# Patient Record
Sex: FEMALE | Race: ASIAN | NOT HISPANIC OR LATINO
[De-identification: names, ages, dates, MRNs, and addresses within clinical notes are randomized per-mention and may not be internally consistent; named-entity substitution may affect disease eponyms.]

---

## 2017-01-13 ENCOUNTER — APPOINTMENT (OUTPATIENT)
Dept: PULMONOLOGY | Facility: CLINIC | Age: 57
End: 2017-01-13

## 2017-01-13 VITALS
DIASTOLIC BLOOD PRESSURE: 76 MMHG | SYSTOLIC BLOOD PRESSURE: 114 MMHG | HEART RATE: 72 BPM | HEIGHT: 67 IN | WEIGHT: 136 LBS | OXYGEN SATURATION: 100 % | BODY MASS INDEX: 21.35 KG/M2

## 2017-02-25 ENCOUNTER — RX RENEWAL (OUTPATIENT)
Age: 57
End: 2017-02-25

## 2017-03-03 ENCOUNTER — APPOINTMENT (OUTPATIENT)
Dept: MAMMOGRAPHY | Facility: IMAGING CENTER | Age: 57
End: 2017-03-03

## 2017-03-03 ENCOUNTER — OUTPATIENT (OUTPATIENT)
Dept: OUTPATIENT SERVICES | Facility: HOSPITAL | Age: 57
LOS: 1 days | End: 2017-03-03
Payer: COMMERCIAL

## 2017-03-03 DIAGNOSIS — Z00.8 ENCOUNTER FOR OTHER GENERAL EXAMINATION: ICD-10-CM

## 2017-03-03 PROCEDURE — 77067 SCR MAMMO BI INCL CAD: CPT

## 2017-03-03 PROCEDURE — 77063 BREAST TOMOSYNTHESIS BI: CPT

## 2017-06-09 ENCOUNTER — APPOINTMENT (OUTPATIENT)
Dept: PULMONOLOGY | Facility: CLINIC | Age: 57
End: 2017-06-09

## 2017-06-09 VITALS
SYSTOLIC BLOOD PRESSURE: 110 MMHG | HEIGHT: 67 IN | DIASTOLIC BLOOD PRESSURE: 66 MMHG | HEART RATE: 86 BPM | OXYGEN SATURATION: 99 %

## 2017-07-19 ENCOUNTER — RX RENEWAL (OUTPATIENT)
Age: 57
End: 2017-07-19

## 2017-07-21 ENCOUNTER — APPOINTMENT (OUTPATIENT)
Dept: PULMONOLOGY | Facility: CLINIC | Age: 57
End: 2017-07-21

## 2017-08-21 ENCOUNTER — RX RENEWAL (OUTPATIENT)
Age: 57
End: 2017-08-21

## 2017-12-20 ENCOUNTER — APPOINTMENT (OUTPATIENT)
Dept: PULMONOLOGY | Facility: CLINIC | Age: 57
End: 2017-12-20
Payer: COMMERCIAL

## 2017-12-20 VITALS
HEART RATE: 77 BPM | SYSTOLIC BLOOD PRESSURE: 110 MMHG | BODY MASS INDEX: 21.19 KG/M2 | DIASTOLIC BLOOD PRESSURE: 70 MMHG | OXYGEN SATURATION: 96 % | WEIGHT: 135 LBS | HEIGHT: 67 IN

## 2017-12-20 PROCEDURE — 99214 OFFICE O/P EST MOD 30 MIN: CPT | Mod: 25

## 2017-12-20 PROCEDURE — 94729 DIFFUSING CAPACITY: CPT

## 2017-12-20 PROCEDURE — 94060 EVALUATION OF WHEEZING: CPT

## 2017-12-20 PROCEDURE — 94727 GAS DIL/WSHOT DETER LNG VOL: CPT

## 2017-12-20 RX ORDER — ALBUTEROL SULFATE 90 UG/1
108 (90 BASE) AEROSOL, METERED RESPIRATORY (INHALATION) EVERY 6 HOURS
Qty: 1 | Refills: 3 | Status: ACTIVE | COMMUNITY
Start: 2017-12-20 | End: 1900-01-01

## 2018-03-20 ENCOUNTER — RX RENEWAL (OUTPATIENT)
Age: 58
End: 2018-03-20

## 2018-03-30 ENCOUNTER — OUTPATIENT (OUTPATIENT)
Dept: OUTPATIENT SERVICES | Facility: HOSPITAL | Age: 58
LOS: 1 days | End: 2018-03-30
Payer: COMMERCIAL

## 2018-03-30 ENCOUNTER — APPOINTMENT (OUTPATIENT)
Dept: MAMMOGRAPHY | Facility: IMAGING CENTER | Age: 58
End: 2018-03-30
Payer: COMMERCIAL

## 2018-03-30 DIAGNOSIS — Z00.8 ENCOUNTER FOR OTHER GENERAL EXAMINATION: ICD-10-CM

## 2018-03-30 PROCEDURE — 77063 BREAST TOMOSYNTHESIS BI: CPT | Mod: 26

## 2018-03-30 PROCEDURE — 77067 SCR MAMMO BI INCL CAD: CPT

## 2018-03-30 PROCEDURE — 77063 BREAST TOMOSYNTHESIS BI: CPT

## 2018-03-30 PROCEDURE — 77067 SCR MAMMO BI INCL CAD: CPT | Mod: 26

## 2018-04-25 ENCOUNTER — RX RENEWAL (OUTPATIENT)
Age: 58
End: 2018-04-25

## 2018-06-20 ENCOUNTER — APPOINTMENT (OUTPATIENT)
Dept: PULMONOLOGY | Facility: CLINIC | Age: 58
End: 2018-06-20
Payer: COMMERCIAL

## 2018-06-20 VITALS
WEIGHT: 135 LBS | DIASTOLIC BLOOD PRESSURE: 80 MMHG | OXYGEN SATURATION: 98 % | SYSTOLIC BLOOD PRESSURE: 118 MMHG | HEART RATE: 73 BPM | BODY MASS INDEX: 21.14 KG/M2

## 2018-06-20 PROCEDURE — 99214 OFFICE O/P EST MOD 30 MIN: CPT | Mod: 25

## 2018-06-20 PROCEDURE — 94727 GAS DIL/WSHOT DETER LNG VOL: CPT

## 2018-06-20 PROCEDURE — 94060 EVALUATION OF WHEEZING: CPT

## 2018-06-20 PROCEDURE — 94729 DIFFUSING CAPACITY: CPT

## 2018-08-30 ENCOUNTER — RX RENEWAL (OUTPATIENT)
Age: 58
End: 2018-08-30

## 2018-12-18 ENCOUNTER — RX RENEWAL (OUTPATIENT)
Age: 58
End: 2018-12-18

## 2019-01-22 ENCOUNTER — APPOINTMENT (OUTPATIENT)
Dept: PULMONOLOGY | Facility: CLINIC | Age: 59
End: 2019-01-22
Payer: COMMERCIAL

## 2019-01-22 VITALS
OXYGEN SATURATION: 95 % | BODY MASS INDEX: 20.36 KG/M2 | DIASTOLIC BLOOD PRESSURE: 70 MMHG | SYSTOLIC BLOOD PRESSURE: 110 MMHG | WEIGHT: 130 LBS | HEART RATE: 70 BPM

## 2019-01-22 PROCEDURE — 99214 OFFICE O/P EST MOD 30 MIN: CPT

## 2019-02-28 ENCOUNTER — RX RENEWAL (OUTPATIENT)
Age: 59
End: 2019-02-28

## 2019-05-21 ENCOUNTER — APPOINTMENT (OUTPATIENT)
Dept: PULMONOLOGY | Facility: CLINIC | Age: 59
End: 2019-05-21

## 2019-07-15 ENCOUNTER — TRANSCRIPTION ENCOUNTER (OUTPATIENT)
Age: 59
End: 2019-07-15

## 2019-11-25 ENCOUNTER — RX RENEWAL (OUTPATIENT)
Age: 59
End: 2019-11-25

## 2020-02-10 ENCOUNTER — APPOINTMENT (OUTPATIENT)
Dept: PULMONOLOGY | Facility: CLINIC | Age: 60
End: 2020-02-10
Payer: COMMERCIAL

## 2020-02-10 VITALS
SYSTOLIC BLOOD PRESSURE: 128 MMHG | RESPIRATION RATE: 16 BRPM | OXYGEN SATURATION: 95 % | TEMPERATURE: 98.7 F | HEIGHT: 67 IN | BODY MASS INDEX: 20.25 KG/M2 | WEIGHT: 129 LBS | HEART RATE: 73 BPM | DIASTOLIC BLOOD PRESSURE: 81 MMHG

## 2020-02-10 DIAGNOSIS — Z87.09 PERSONAL HISTORY OF OTHER DISEASES OF THE RESPIRATORY SYSTEM: ICD-10-CM

## 2020-02-10 PROCEDURE — 99214 OFFICE O/P EST MOD 30 MIN: CPT

## 2020-02-10 NOTE — PHYSICAL EXAM
[Normal Oropharynx] : normal oropharynx [No Acute Distress] : no acute distress [I] : Mallampati Class: I [Normal Appearance] : normal appearance [No Neck Mass] : no neck mass [Normal Rate/Rhythm] : normal rate/rhythm [Normal S1, S2] : normal s1, s2 [No Murmurs] : no murmurs [No Resp Distress] : no resp distress [Benign] : benign [No HSM] : no hsm [No Hernias] : no hernias [No Edema] : no edema [Oriented x3] : oriented x3 [Normal Affect] : normal affect

## 2020-09-25 ENCOUNTER — APPOINTMENT (OUTPATIENT)
Dept: PULMONOLOGY | Facility: CLINIC | Age: 60
End: 2020-09-25
Payer: COMMERCIAL

## 2020-09-25 VITALS
SYSTOLIC BLOOD PRESSURE: 123 MMHG | RESPIRATION RATE: 16 BRPM | WEIGHT: 123 LBS | BODY MASS INDEX: 19.3 KG/M2 | HEART RATE: 77 BPM | TEMPERATURE: 97.3 F | DIASTOLIC BLOOD PRESSURE: 80 MMHG | HEIGHT: 67 IN | OXYGEN SATURATION: 96 %

## 2020-09-25 DIAGNOSIS — Z00.00 ENCOUNTER FOR GENERAL ADULT MEDICAL EXAMINATION W/OUT ABNORMAL FINDINGS: ICD-10-CM

## 2020-09-25 PROCEDURE — 90662 IIV NO PRSV INCREASED AG IM: CPT

## 2020-09-25 PROCEDURE — G0008: CPT

## 2020-09-25 PROCEDURE — 99214 OFFICE O/P EST MOD 30 MIN: CPT | Mod: 25

## 2020-09-25 NOTE — PHYSICAL EXAM
[No Acute Distress] : no acute distress [Normal Oropharynx] : normal oropharynx [I] : Mallampati Class: I [Normal Appearance] : normal appearance [No Neck Mass] : no neck mass [Normal Rate/Rhythm] : normal rate/rhythm [Normal S1, S2] : normal s1, s2 [No Murmurs] : no murmurs [No Resp Distress] : no resp distress [Benign] : benign [No HSM] : no hsm [No Hernias] : no hernias [No Edema] : no edema [Oriented x3] : oriented x3 [Normal Affect] : normal affect

## 2020-10-02 ENCOUNTER — MED ADMIN CHARGE (OUTPATIENT)
Age: 60
End: 2020-10-02

## 2020-12-23 PROBLEM — Z87.09 HISTORY OF UPPER RESPIRATORY INFECTION: Status: RESOLVED | Noted: 2018-06-20 | Resolved: 2020-12-23

## 2021-01-25 ENCOUNTER — RX RENEWAL (OUTPATIENT)
Age: 61
End: 2021-01-25

## 2021-03-20 ENCOUNTER — NON-APPOINTMENT (OUTPATIENT)
Age: 61
End: 2021-03-20

## 2021-04-09 ENCOUNTER — APPOINTMENT (OUTPATIENT)
Dept: PULMONOLOGY | Facility: CLINIC | Age: 61
End: 2021-04-09
Payer: COMMERCIAL

## 2021-04-09 PROCEDURE — 99072 ADDL SUPL MATRL&STAF TM PHE: CPT

## 2021-04-09 PROCEDURE — 99213 OFFICE O/P EST LOW 20 MIN: CPT

## 2021-04-09 NOTE — DISCUSSION/SUMMARY
[FreeTextEntry1] : \par Asthma, allergic.  Continue Symbicort 160/4.5, montelukast, albuterol as needed\par \par influenza vaccine given today\par \par She believes she had pneumonia vaccine\par \par \par Ricky Burgos MD [facility]

## 2021-04-09 NOTE — HISTORY OF PRESENT ILLNESS
[TextBox_4] : 60 year old woman returns for follow up.\par \par She had been using Symbicort 160/4.5 1 puff twice per day, with montelukast and Proair. She has elevated IgE and allergy to several factors. she occasionally uses antihistamine. \par Allergies are minimal still, with minimal symptoms.  she has been working from home. \par \par She has had no exacerbation. However, she has had minor sore throat and chill. \par \par \par CT chest 2/2016 revealed stable scarring with mucoid impaction, mild bronchiolitis \par \par She had COVID  antibody testing which was negative\par \par \par SH\par \par never smoked [TextBox_19] : SLEEP\par No snoring, witnessed apnea, excessive daytime sleepiness, morning headache

## 2021-04-09 NOTE — REVIEW OF SYSTEMS
[Chills] : chills [Postnasal Drip] : postnasal drip [Sinus Problems] : sinus problems [Cough] : cough [Hay Fever] : hay fever [Fever] : no fever [Nasal Congestion] : no nasal congestion [Sputum] : not coughing up ~M sputum [Dyspnea] : no dyspnea [Orthopnea] : no orthopnea [Dysrhythmia] : no dysrhythmia [Edema] : ~T edema was not present [Nasal Discharge] : no nasal discharge [Nausea] : no nausea [Urgency] : no feelings of urinary urgency

## 2021-04-09 NOTE — REVIEW OF SYSTEMS
[Dry Eyes] : no dry eyes [Fever] : no fever [Nasal Congestion] : no nasal congestion [Cough] : no cough [Sputum] : no sputum [Dyspnea] : no dyspnea [GERD] : no gerd

## 2021-04-09 NOTE — DISCUSSION/SUMMARY
[FreeTextEntry1] : \par Asthma, allergic.  Continue ICS/LABA, montelukast, albuterol as needed\par \par She uses antihistamine as needed\par \par No sinus symptoms\par \par She had pneumonia vaccine and influenza vaccine\par \par Also received full COVID vaccine\par \par She will continue regimen for asthma\par \par She will undergo CXR given he has history of latent TB\par \par \par [facility]

## 2021-04-09 NOTE — REVIEW OF SYSTEMS
[Nasal Congestion] : nasal congestion [Cough] : cough [Fever] : no fever [Dry Eyes] : no dry eyes [Sputum] : no sputum [Dyspnea] : no dyspnea [GERD] : no gerd

## 2021-04-09 NOTE — REVIEW OF SYSTEMS
[Fever] : no fever [Dry Eyes] : no dry eyes [Nasal Congestion] : no nasal congestion [Cough] : no cough [Sputum] : no sputum [Dyspnea] : no dyspnea [GERD] : no gerd

## 2021-04-09 NOTE — DISCUSSION/SUMMARY
[FreeTextEntry1] : mild URI\par \par improved\par \par Asthma, allergic.  She will continue Symbicort 160/4.5, montelukast, albuterol asneeded\par \par \par [facility]

## 2021-04-09 NOTE — PHYSICAL EXAM
[General Appearance - Well Developed] : well developed [General Appearance - Well Nourished] : well nourished [No Deformities] : no deformities [Normal Conjunctiva] : the conjunctiva exhibited no abnormalities [Normal Oropharynx] : normal oropharynx [Neck Appearance] : the appearance of the neck was normal [Neck Cervical Mass (___cm)] : no neck mass was observed [Jugular Venous Distention Increased] : there was no jugular-venous distention [Heart Rate And Rhythm] : heart rate and rhythm were normal [Heart Sounds] : normal S1 and S2 [Murmurs] : no murmurs present [Arterial Pulses Normal] : the arterial pulses were normal [Respiration, Rhythm And Depth] : normal respiratory rhythm and effort [Exaggerated Use Of Accessory Muscles For Inspiration] : no accessory muscle use [Bowel Sounds] : normal bowel sounds [Abdomen Soft] : soft [Abdomen Tenderness] : non-tender [] : no hepato-splenomegaly [Abnormal Walk] : normal gait [Motor Exam] : the motor exam was normal [Affect] : the affect was normal [Mood] : the mood was normal [Low Lying Soft Palate] : no low lying soft palate [Erythema] : no erythema of the pharynx [FreeTextEntry1] : no edema

## 2021-04-09 NOTE — HISTORY OF PRESENT ILLNESS
[TextBox_4] : 61 year old woman returns for follow up for allergic asthma.  A prior CT of the sinuses has shown sinusitis. \par She has elevated IgE and allergy to several factors. she occasionally uses antihistamine. \par Allergies are minimal still, with minimal symptoms.  she has been working from home. \par \par She had been using Symbicort 160/4.5 1 puff twice per day, with montelukast and Proair. \par \par She will switch to generic Advair.\par \par She has had no exacerbation. However, she does note some allergy symptoms.  She has minor dyspnea on exertion. \par \par \par CT chest 2/2016 revealed stable scarring with mucoid impaction, mild bronchiolitis \par \par She has  had COVID  antibody testing which was negative\par \par She has had COVID vaccine.\par \par She has latent TB, diagnosed about 6 years ago.  Treatment with antibiotic  has been deferred and she has not had reactivation. \par \par \par SH\par \par never smoked [TextBox_19] : SLEEP\par No snoring, witnessed apnea, excessive daytime sleepiness, morning headache

## 2021-04-09 NOTE — PHYSICAL EXAM
[No Acute Distress] : no acute distress [Normal Oropharynx] : normal oropharynx [I] : Mallampati Class: I [Normal Appearance] : normal appearance [No Neck Mass] : no neck mass [Normal S1, S2] : normal s1, s2 [Normal Rate/Rhythm] : normal rate/rhythm [No Murmurs] : no murmurs [No Resp Distress] : no resp distress [Benign] : benign [No HSM] : no hsm [No Hernias] : no hernias [No Edema] : no edema [Oriented x3] : oriented x3 [Normal Affect] : normal affect

## 2021-04-09 NOTE — HISTORY OF PRESENT ILLNESS
[TextBox_4] : 58 year old woman returns for follow up.\par \par She had been using Symbicort 160/4.5 1 puff twice per day, with montelukast and Proair. She has elevated IgE and allergy to several factors. she occasionally uses antihistamine. She has latent TB.\par Allergies are minimal.\par \par She had URI recently that is improving,  still has some cough but no dyspnea.\par \par \par CT chest 2/2016 revealed stable scarring with mucoid impaction, mild bronchiolitis \par \par \par SH\par \par never smoked

## 2021-04-09 NOTE — DISCUSSION/SUMMARY
[FreeTextEntry1] : Asthma, allergic\par \par \par She will remain on Symbicort 160/4.5 2 puffs twice  per day, montelukast and albuterol rescue.\par she has had her influenza vaccine this season\par \par She may use antihistamine for allergy symptoms\par \par She has dust mite shields on bed\par \par [facility]

## 2021-04-09 NOTE — HISTORY OF PRESENT ILLNESS
[FreeTextEntry1] : 58 year old woman returns for follow up.\par \par She had  been using Symbicort 80/4.5 1 puff twice per day, with montelukast and Proair.  She noted increased dyspnea and asked to be restarted on Symbicort 160/4.5.  She states that she feels improved since switching.  Se has elevated Ige and allergy to several factors.  she occasionally uses antihistamine.  \par Allergies are minimal.\par \par CT chest 2/2016 revealed stable scarring with mucoid impaction, mild bronchiolitis  \par \par \par

## 2021-08-16 ENCOUNTER — LABORATORY RESULT (OUTPATIENT)
Age: 61
End: 2021-08-16

## 2021-08-16 ENCOUNTER — APPOINTMENT (OUTPATIENT)
Dept: PULMONOLOGY | Facility: CLINIC | Age: 61
End: 2021-08-16
Payer: COMMERCIAL

## 2021-08-16 VITALS
DIASTOLIC BLOOD PRESSURE: 70 MMHG | OXYGEN SATURATION: 98 % | WEIGHT: 127 LBS | SYSTOLIC BLOOD PRESSURE: 122 MMHG | BODY MASS INDEX: 19.89 KG/M2 | HEART RATE: 83 BPM | TEMPERATURE: 96.8 F

## 2021-08-16 PROCEDURE — 99213 OFFICE O/P EST LOW 20 MIN: CPT

## 2021-08-16 NOTE — DISCUSSION/SUMMARY
[FreeTextEntry1] : Increased sputum \par history allergy\par \par Plan\par \par sputum for AFB rule out IDRIS\par \par Recheck allergy parameters, hypersensitivity panel\par \par Ricky Burgos MD

## 2021-08-16 NOTE — HISTORY OF PRESENT ILLNESS
[TextBox_4] : 61 year old woman returns for follow up for allergic asthma.  A prior CT of the sinuses has shown sinusitis. \par She has elevated IgE and allergy to several factors. she occasionally uses antihistamine. \par Allergies are minimal still, with minimal symptoms.  she has been working from home. \par \par She had been using Symbicort 160/4.5 1 puff twice per day, with montelukast and Proair. \par \par She will switch to generic Advair.\par \par She has had no exacerbation. However, she does note some allergy symptoms.  She has minor dyspnea on exertion. \par \par \par CT chest 2/2016 revealed stable scarring with mucoid impaction, mild bronchiolitis \par \par She has  had COVID  antibody testing which was negative\par \par She has had COVID vaccine.\par \par She has latent TB, diagnosed about 6 years ago.  Treatment with antibiotic  has been deferred and she has not had reactivation. \par \par She felt worse when using Advair.  She has switched back to Symbicort, with mucinex.\par \par CT chest did demonstrate increased mucus.\par \par She feels better.\par \par She only has cough in the morning, with clear sputum.  However, during exacerbation sputum was yellow.\par \par \par \par \par \par SH\par \par never smoked [TextBox_19] : SLEEP\par No snoring, witnessed apnea, excessive daytime sleepiness, morning headache

## 2021-09-12 LAB
A ALTERNATA IGE QN: <0.1 KUA/L
A FUMIGATUS IGE QN: 0.41 KUA/L
BASOPHILS # BLD AUTO: 0.05 K/UL
BASOPHILS NFR BLD AUTO: 0.8 %
BERMUDA GRASS IGE QN: 1.36 KUA/L
BOXELDER IGE QN: 1.23 KUA/L
C HERBARUM IGE QN: <0.1 KUA/L
CALIF WALNUT IGE QN: 2.42 KUA/L
CAT DANDER IGE QN: 1.31 KUA/L
CMN PIGWEED IGE QN: 1.28 KUA/L
COMMON RAGWEED IGE QN: 1.28 KUA/L
COTTONWOOD IGE QN: 1.25 KUA/L
D FARINAE IGE QN: 1.01 KUA/L
D PTERONYSS IGE QN: 0.84 KUA/L
DEPRECATED A ALTERNATA IGE RAST QL: 0
DEPRECATED A FUMIGATUS IGE RAST QL: 1
DEPRECATED BERMUDA GRASS IGE RAST QL: 2
DEPRECATED BOXELDER IGE RAST QL: 2
DEPRECATED C HERBARUM IGE RAST QL: 0
DEPRECATED CAT DANDER IGE RAST QL: 2
DEPRECATED COMMON PIGWEED IGE RAST QL: 2
DEPRECATED COMMON RAGWEED IGE RAST QL: 2
DEPRECATED COTTONWOOD IGE RAST QL: 2
DEPRECATED D FARINAE IGE RAST QL: 2
DEPRECATED D PTERONYSS IGE RAST QL: 2
DEPRECATED DOG DANDER IGE RAST QL: NORMAL
DEPRECATED GOOSEFOOT IGE RAST QL: 2
DEPRECATED LONDON PLANE IGE RAST QL: 2
DEPRECATED MOUSE URINE PROT IGE RAST QL: 0
DEPRECATED MUGWORT IGE RAST QL: 1
DEPRECATED P NOTATUM IGE RAST QL: NORMAL
DEPRECATED RED CEDAR IGE RAST QL: 1
DEPRECATED ROACH IGE RAST QL: NORMAL
DEPRECATED SHEEP SORREL IGE RAST QL: 2
DEPRECATED SILVER BIRCH IGE RAST QL: 4
DEPRECATED TIMOTHY IGE RAST QL: 2
DEPRECATED WHITE ASH IGE RAST QL: 2
DEPRECATED WHITE OAK IGE RAST QL: 4
DOG DANDER IGE QN: 0.21 KUA/L
EOSINOPHIL # BLD AUTO: 0.44 K/UL
EOSINOPHIL NFR BLD AUTO: 6.8 %
GOOSEFOOT IGE QN: 1.15 KUA/L
HCT VFR BLD CALC: 40.3 %
HGB BLD-MCNC: 13.1 G/DL
IGE SER-MCNC: 178 KU/L
IMM GRANULOCYTES NFR BLD AUTO: 0.2 %
LONDON PLANE IGE QN: 3.36 KUA/L
LYMPHOCYTES # BLD AUTO: 1.68 K/UL
LYMPHOCYTES NFR BLD AUTO: 26.1 %
MAN DIFF?: NORMAL
MCHC RBC-ENTMCNC: 32.5 GM/DL
MCHC RBC-ENTMCNC: 32.7 PG
MCV RBC AUTO: 100.5 FL
MONOCYTES # BLD AUTO: 0.51 K/UL
MONOCYTES NFR BLD AUTO: 7.9 %
MOUSE URINE PROT IGE QN: <0.1 KUA/L
MUGWORT IGE QN: 0.66 KUA/L
MULBERRY (T70) CLASS: NORMAL
MULBERRY (T70) CONC: 0.11 KUA/L
NEUTROPHILS # BLD AUTO: 3.75 K/UL
NEUTROPHILS NFR BLD AUTO: 58.2 %
P NOTATUM IGE QN: 0.24 KUA/L
PLATELET # BLD AUTO: 239 K/UL
RBC # BLD: 4.01 M/UL
RBC # FLD: 12.3 %
RED CEDAR IGE QN: 0.46 KUA/L
ROACH IGE QN: 0.17 KUA/L
SHEEP SORREL IGE QN: 1.04 KUA/L
SILVER BIRCH IGE QN: 28.1 KUA/L
TIMOTHY IGE QN: 1.47 KUA/L
TREE ALLERG MIX1 IGE QL: 2
WBC # FLD AUTO: 6.44 K/UL
WHITE ASH IGE QN: 2.75 KUA/L
WHITE ELM IGE QN: 1.73 KUA/L
WHITE ELM IGE QN: 2
WHITE OAK IGE QN: 20.4 KUA/L

## 2021-10-08 ENCOUNTER — APPOINTMENT (OUTPATIENT)
Dept: PULMONOLOGY | Facility: CLINIC | Age: 61
End: 2021-10-08

## 2021-11-07 ENCOUNTER — TRANSCRIPTION ENCOUNTER (OUTPATIENT)
Age: 61
End: 2021-11-07

## 2021-12-13 ENCOUNTER — APPOINTMENT (OUTPATIENT)
Dept: PULMONOLOGY | Facility: CLINIC | Age: 61
End: 2021-12-13

## 2022-01-23 ENCOUNTER — RX RENEWAL (OUTPATIENT)
Age: 62
End: 2022-01-23

## 2022-04-20 ENCOUNTER — APPOINTMENT (OUTPATIENT)
Dept: PULMONOLOGY | Facility: CLINIC | Age: 62
End: 2022-04-20
Payer: COMMERCIAL

## 2022-04-20 VITALS
DIASTOLIC BLOOD PRESSURE: 74 MMHG | WEIGHT: 127 LBS | HEART RATE: 79 BPM | BODY MASS INDEX: 19.89 KG/M2 | SYSTOLIC BLOOD PRESSURE: 128 MMHG | OXYGEN SATURATION: 95 % | TEMPERATURE: 98.2 F

## 2022-04-20 PROCEDURE — 99214 OFFICE O/P EST MOD 30 MIN: CPT

## 2022-04-20 RX ORDER — FLUTICASONE PROPIONATE AND SALMETEROL 250; 50 UG/1; UG/1
250-50 POWDER RESPIRATORY (INHALATION)
Qty: 3 | Refills: 0 | Status: DISCONTINUED | COMMUNITY
Start: 2021-03-20 | End: 2022-04-20

## 2022-04-20 NOTE — PHYSICAL EXAM
[No Acute Distress] : no acute distress [Normal Oropharynx] : normal oropharynx [I] : Mallampati Class: I [Normal Appearance] : normal appearance [No Neck Mass] : no neck mass [Normal Rate/Rhythm] : normal rate/rhythm [Normal S1, S2] : normal s1, s2 [No Murmurs] : no murmurs [No Resp Distress] : no resp distress [Benign] : benign [No HSM] : no hsm [No Hernias] : no hernias [No Edema] : no edema [No Focal Deficits] : no focal deficits [Oriented x3] : oriented x3 [Normal Affect] : normal affect

## 2022-04-21 RX ORDER — OLOPATADINE HCL 1 MG/ML
0.1 SOLUTION/ DROPS OPHTHALMIC TWICE DAILY
Qty: 1 | Refills: 3 | Status: ACTIVE | COMMUNITY
Start: 2021-04-09 | End: 1900-01-01

## 2022-04-21 NOTE — HISTORY OF PRESENT ILLNESS
[TextBox_4] : 62 year old woman returns for follow up for allergic asthma.  A prior CT of the sinuses has shown sinusitis. \par She has elevated IgE and allergy to several factors. she occasionally uses antihistamine. \par Allergies are minimal still, with minimal symptoms.  she has been working from home. \par \par CT chest 2/2016 revealed stable scarring with mucoid impaction, mild bronchiolitis \par \par She has  had COVID  antibody testing which was negative\par \par She has had COVID vaccine.\par \par She has latent TB, diagnosed about 6 years ago.  Treatment with antibiotic  has been deferred and she has not had reactivation. \par \par She felt worse when using Advair.  She has switched back to Symbicort 160/4.5 2 puffs twice per day, montelukast, albuterol MDI as needed  with mucinex.\par \par CT chest did demonstrate increased mucus.\par \par \par She only has cough in the morning, with clear sputum.  However, during exacerbation sputum was yellow.\par \par She has seasonal allergy, has run out of montelukast\par \par She is using Symbicort 160/4.5 2 puffs twice per day.\par \par albuterol MDI as needed \par \par She had right ankle fracture fracture repair\par \par She had COVID infection October 2021, tolerated well\par \par SH\par \par never smoked [TextBox_19] : SLEEP\par No snoring, witnessed apnea, excessive daytime sleepiness, morning headache

## 2022-04-21 NOTE — DISCUSSION/SUMMARY
[FreeTextEntry1] : Increased sputum \par history allergy, elevated IgE and eos 440\par sputum AFB was negative\par \par Plan\par continue Symbicort 160/4.5 2 puffs twice per day.\par \par albuterol MDI as needed and montelukast\par \par add antihistamine for eyes\par \par suggest taking fexofenadine\par \par Total time spent : 30 minutes\par Including:\par Preparation prior to visit - Reviewing prior record, results of tests and Consultation Reports as applicable\par Conducting an appropriate H & P during today's encounter\par Appropriate orders for tests, medications and procedures, as applicable\par Counseling patient \par Note completion \par \par Ricky Burgos MD

## 2022-05-01 LAB — ACID FAST STN SPT: NORMAL

## 2022-11-01 ENCOUNTER — APPOINTMENT (OUTPATIENT)
Dept: PULMONOLOGY | Facility: CLINIC | Age: 62
End: 2022-11-01

## 2022-11-01 VITALS
WEIGHT: 127 LBS | TEMPERATURE: 96.6 F | BODY MASS INDEX: 19.89 KG/M2 | SYSTOLIC BLOOD PRESSURE: 110 MMHG | DIASTOLIC BLOOD PRESSURE: 68 MMHG | OXYGEN SATURATION: 97 % | HEART RATE: 84 BPM

## 2022-11-01 DIAGNOSIS — Z23 ENCOUNTER FOR IMMUNIZATION: ICD-10-CM

## 2022-11-01 PROCEDURE — 99214 OFFICE O/P EST MOD 30 MIN: CPT | Mod: 25

## 2022-11-01 PROCEDURE — G0008: CPT

## 2022-11-01 PROCEDURE — 90686 IIV4 VACC NO PRSV 0.5 ML IM: CPT

## 2022-11-01 NOTE — HISTORY OF PRESENT ILLNESS
[Never] : never [TextBox_4] : 62 year old woman returns for follow up for allergic asthma.  A prior CT of the sinuses has shown sinusitis. \par She has elevated IgE and allergy to several factors. she occasionally uses antihistamine. \par Allergies are minimal still, with minimal symptoms.  she has been working from home. \par \par CT chest 2/2016 revealed stable scarring with mucoid impaction, mild bronchiolitis \par \par She has  had COVID  antibody testing which was negative\par \par She has had COVID vaccine.\par \par She has latent TB.  Treatment with antibiotic  has been deferred and she has not had reactivation. \par \par She felt worse when using Advair.  She has switched back to Symbicort 160/4.5 2 puffs twice per day, montelukast, albuterol MDI as needed  with mucinex.\par \par CT chest did demonstrate increased mucus.\par \par \par She only has cough in the morning, with clear sputum.  However, during exacerbation sputum was yellow.\par \par \par She had right ankle fracture fracture repair\par \par She had COVID infection October 2021, tolerated well\par \par \par She has been breathing well.  She has not had exacerbation.  Using Symbicort 160/4.5 2 puffs twice per day.\par \par albuterol MDI as needed   and montelukast 10 mg \par \par She uses eye drops for allergy\par \par SH\par \par never smoked [TextBox_19] : SLEEP\par No snoring, witnessed apnea, excessive daytime sleepiness, morning headache

## 2022-11-01 NOTE — REASON FOR VISIT
[Follow-Up] : a follow-up visit [Asthma] : asthma [Latent TB/ +PPD/ +IGRA] : latent TB/ +PPD/ +IGRA [TextBox_44] : Allergic asthma

## 2022-11-01 NOTE — DISCUSSION/SUMMARY
[FreeTextEntry1] : Allergic asthma without exacerbation she is using Symbicort and montelukast\par \par She has history allergy, elevated IgE and eos 440\par sputum AFB was negative\par \par controlled with eye drops\par \par Has had abnormal PFT, recommended repeat \par \par Plan\par continue Symbicort 160/4.5 2 puffs twice per day.\par \par albuterol MDI as needed and montelukast daily\par \par Use antihistamine for eyes\par \par Not needed fexofenadine\par \par She declines PFT at this time\par \par flu vaccine given today\par \par She has had had pneumonia vaccine \par \par \par As symptoms improved, no current need for testing IgE and eosinophils\par \par Total time spent : 30 minutes\par Including:\par Preparation prior to visit - Reviewing prior record, results of tests and Consultation Reports as applicable\par Conducting an appropriate H & P during today's encounter\par Appropriate orders for tests, medications and procedures, as applicable\par Counseling patient \par Note completion \par \par Ricky Burgos MD

## 2022-11-10 ENCOUNTER — MED ADMIN CHARGE (OUTPATIENT)
Age: 62
End: 2022-11-10

## 2023-04-26 ENCOUNTER — RX RENEWAL (OUTPATIENT)
Age: 63
End: 2023-04-26

## 2023-06-05 ENCOUNTER — APPOINTMENT (OUTPATIENT)
Dept: PULMONOLOGY | Facility: CLINIC | Age: 63
End: 2023-06-05
Payer: COMMERCIAL

## 2023-06-05 VITALS
BODY MASS INDEX: 19.26 KG/M2 | OXYGEN SATURATION: 95 % | SYSTOLIC BLOOD PRESSURE: 108 MMHG | TEMPERATURE: 96.8 F | HEART RATE: 82 BPM | DIASTOLIC BLOOD PRESSURE: 68 MMHG | WEIGHT: 123 LBS

## 2023-06-05 PROCEDURE — 94729 DIFFUSING CAPACITY: CPT

## 2023-06-05 PROCEDURE — 94060 EVALUATION OF WHEEZING: CPT

## 2023-06-05 PROCEDURE — 99214 OFFICE O/P EST MOD 30 MIN: CPT | Mod: 25

## 2023-06-05 PROCEDURE — 94727 GAS DIL/WSHOT DETER LNG VOL: CPT

## 2023-06-05 NOTE — PROCEDURE
[FreeTextEntry1] : PFT  demonstrates  mild  obstructive ventilatory defect with diminished FEV1.   The FEV1/FVC ratio is diminished at  63 percent.\par \par There was no  positive bronchodilator response\par \par There is no restriction demonstrated by normal TLC.  There is hyperinflation seen by elevated RV.\par \par The DLCO is yvrose\par \par Results are stable \par \par Ricky Burgos MD

## 2023-06-05 NOTE — REASON FOR VISIT
[Follow-Up] : a follow-up visit [Asthma] : asthma [Cough] : cough [Latent TB/ +PPD/ +IGRA] : latent TB/ +PPD/ +IGRA [TextBox_44] : Allergic asthma

## 2023-06-05 NOTE — DISCUSSION/SUMMARY
[FreeTextEntry1] : Allergic asthma without exacerbation she is using Symbicort and montelukast\par \par She has history allergy, elevated IgE and eos 440\par sputum AFB was negative\par \par Has had abnormal PFT, stable\par \par she has some cough with sputum\par \par Ct in 2021 has shown mucoid impaction\par \par Would add mucinex daily\par \par recommend antihistamine  Fexofenadine 180 mg daily\par \par rheumatoid arthritis, did not tolerate methotrexate.  She has mild symptoms and is not on medication now\par \par She will follow with rheumatology\par \par Advised to purchase [employer]ella EP mucus clearance device\par \par continue Symbicort 160/4.5 2 puffs twice per day.\par \par albuterol MDI as needed and montelukast daily\par \par Use antihistamine for eyes\par \par She has had had pneumonia vaccine \par \par \par Total time spent : 30 minutes\par Including:\par Preparation prior to visit - Reviewing prior record, results of tests and Consultation Reports as applicable\par Conducting an appropriate H & P during today's encounter\par Appropriate orders for tests, medications and procedures, as applicable\par Counseling patient \par Note completion \par \par Ricky Burgos MD

## 2023-06-05 NOTE — HISTORY OF PRESENT ILLNESS
[Never] : never [TextBox_4] : 62 year old woman returns for follow up for allergic asthma.  A prior CT of the sinuses has shown sinusitis. \par She has elevated IgE and allergy to several factors. she occasionally uses antihistamine. \par Allergies are minimal still, with minimal symptoms.  she has been working from home. \par \par CT chest 2/2016 revealed stable scarring with mucoid impaction, mild bronchiolitis \par \par She has  had COVID  antibody testing which was negative\par \par She has had COVID vaccine.\par \par She has latent TB.  Treatment with antibiotic  has been deferred and she has not had reactivation. \par \par She felt worse when using Advair.  She has switched back to Symbicort 160/4.5 2 puffs twice per day, montelukast, albuterol MDI as needed  with mucinex.\par \par CT chest in 2021  did demonstrate increased mucus.\par \par She only has cough in the morning, with clear sputum.  However, during exacerbation sputum was yellow.\par \par \par She had right ankle fracture fracture repaired\par \par She had COVID infection October 2021, tolerated well\par \par \par She has been breathing well.  She has not had exacerbation.  Using Symbicort 160/4.5 2 puffs twice per day.\par \par albuterol MDI as needed   and montelukast 10 mg \par \par She uses eye drops for allergy\par \par \par She was diagnosed with rheumatoid arthritis.  she presented with stiffness and prolonged fatigue\par \par She was started on methotrexate but developed rash\par \par She had URI recently that resolved\par \par SH\par \par never smoked [TextBox_19] : SLEEP\par No snoring, witnessed apnea, excessive daytime sleepiness, morning headache

## 2023-12-04 ENCOUNTER — APPOINTMENT (OUTPATIENT)
Dept: PULMONOLOGY | Facility: CLINIC | Age: 63
End: 2023-12-04
Payer: COMMERCIAL

## 2023-12-04 VITALS
DIASTOLIC BLOOD PRESSURE: 80 MMHG | HEART RATE: 80 BPM | SYSTOLIC BLOOD PRESSURE: 128 MMHG | OXYGEN SATURATION: 99 % | WEIGHT: 122 LBS | TEMPERATURE: 96.4 F | BODY MASS INDEX: 19.11 KG/M2

## 2023-12-04 PROCEDURE — 99214 OFFICE O/P EST MOD 30 MIN: CPT

## 2024-02-10 ENCOUNTER — RX RENEWAL (OUTPATIENT)
Age: 64
End: 2024-02-10

## 2024-03-06 RX ORDER — FLUTICASONE FUROATE AND VILANTEROL TRIFENATATE 100; 25 UG/1; UG/1
100-25 POWDER RESPIRATORY (INHALATION)
Qty: 3 | Refills: 1 | Status: ACTIVE | COMMUNITY
Start: 2024-03-06 | End: 1900-01-01

## 2024-04-24 RX ORDER — BUDESONIDE AND FORMOTEROL FUMARATE DIHYDRATE 160; 4.5 UG/1; UG/1
160-4.5 AEROSOL RESPIRATORY (INHALATION) TWICE DAILY
Qty: 3 | Refills: 3 | Status: ACTIVE | COMMUNITY
Start: 2021-06-28 | End: 1900-01-01

## 2024-05-09 ENCOUNTER — RX RENEWAL (OUTPATIENT)
Age: 64
End: 2024-05-09

## 2024-05-20 ENCOUNTER — NON-APPOINTMENT (OUTPATIENT)
Age: 64
End: 2024-05-20

## 2024-06-10 ENCOUNTER — APPOINTMENT (OUTPATIENT)
Dept: PULMONOLOGY | Facility: CLINIC | Age: 64
End: 2024-06-10
Payer: COMMERCIAL

## 2024-06-10 VITALS
TEMPERATURE: 97.1 F | BODY MASS INDEX: 18.64 KG/M2 | DIASTOLIC BLOOD PRESSURE: 70 MMHG | SYSTOLIC BLOOD PRESSURE: 118 MMHG | HEART RATE: 88 BPM | WEIGHT: 119 LBS | OXYGEN SATURATION: 93 %

## 2024-06-10 VITALS — OXYGEN SATURATION: 95 % | HEART RATE: 86 BPM

## 2024-06-10 DIAGNOSIS — Z22.7 LATENT TUBERCULOSIS: ICD-10-CM

## 2024-06-10 DIAGNOSIS — J45.909 UNSPECIFIED ASTHMA, UNCOMPLICATED: ICD-10-CM

## 2024-06-10 PROCEDURE — 99214 OFFICE O/P EST MOD 30 MIN: CPT

## 2024-06-10 NOTE — DISCUSSION/SUMMARY
[FreeTextEntry1] : Allergic asthma without exacerbation she is using Symbicort and montelukast  She has history allergy, elevated IgE and eos 440 sputum AFB was negative  Has had abnormal PFT, stable  she has some cough with sputum  Ct in 2021 has shown mucoid impaction  Would add mucinex daily  recommend antihistamine  Fexofenadine 180 mg daily  rheumatoid arthritis, did not tolerate methotrexate.  She has mild symptoms and is not on medication now  She will follow with rheumatology   She has some  dyspnea on exertion   She has slight wheeze on exam  PLAN  having trouble getting Symbicort.  Does not note improvement  with Breo or any DPI, wants MDI Given sample of Breztri to try I have been working on this but the patient still has not received it  continue Symbicort 160/4.5 2 puffs twice per day.  Fill out PA  albuterol MDI as needed and montelukast daily  Use antihistamine  suggested fexofenadine or levocetirizine  She has had had pneumonia vaccine   She had URI several weeks ago has minor residual cough.  She received COVID vaccine  will consider pneumonia vaccine, last received 2019.   Total time spent : 30 minutes Including: Preparation prior to visit - Reviewing prior record, results of tests and Consultation Reports as applicable Conducting an appropriate H & P during today's encounter Appropriate orders for tests, medications and procedures, as applicable Counseling patient  Note completion   Ricky Burgos MD

## 2024-06-10 NOTE — REASON FOR VISIT
[Follow-Up] : a follow-up visit [Cough] : cough [Asthma] : asthma [Latent TB/ +PPD/ +IGRA] : latent TB/ +PPD/ +IGRA [TextBox_44] : Allergic asthma

## 2024-07-01 ENCOUNTER — NON-APPOINTMENT (OUTPATIENT)
Age: 64
End: 2024-07-01

## 2024-07-02 ENCOUNTER — APPOINTMENT (OUTPATIENT)
Dept: PULMONOLOGY | Facility: CLINIC | Age: 64
End: 2024-07-02
Payer: COMMERCIAL

## 2024-07-02 ENCOUNTER — LABORATORY RESULT (OUTPATIENT)
Age: 64
End: 2024-07-02

## 2024-07-02 VITALS
TEMPERATURE: 97.1 F | DIASTOLIC BLOOD PRESSURE: 68 MMHG | OXYGEN SATURATION: 95 % | WEIGHT: 119 LBS | BODY MASS INDEX: 18.64 KG/M2 | SYSTOLIC BLOOD PRESSURE: 110 MMHG | HEART RATE: 90 BPM

## 2024-07-02 DIAGNOSIS — J45.40 MODERATE PERSISTENT ASTHMA, UNCOMPLICATED: ICD-10-CM

## 2024-07-02 DIAGNOSIS — J45.41 MODERATE PERSISTENT ASTHMA WITH (ACUTE) EXACERBATION: ICD-10-CM

## 2024-07-02 PROCEDURE — 99214 OFFICE O/P EST MOD 30 MIN: CPT

## 2024-07-02 RX ORDER — METHYLPREDNISOLONE 4 MG/1
4 TABLET ORAL
Qty: 1 | Refills: 0 | Status: ACTIVE | COMMUNITY
Start: 2024-07-02 | End: 1900-01-01

## 2024-07-02 RX ORDER — ALBUTEROL SULFATE 90 UG/1
108 (90 BASE) INHALANT RESPIRATORY (INHALATION)
Qty: 1 | Refills: 4 | Status: ACTIVE | COMMUNITY
Start: 2024-07-02 | End: 1900-01-01

## 2024-07-02 RX ORDER — AZITHROMYCIN 250 MG/1
250 TABLET, FILM COATED ORAL
Qty: 1 | Refills: 0 | Status: ACTIVE | COMMUNITY
Start: 2024-07-02 | End: 1900-01-01

## 2024-07-03 LAB
BASOPHILS # BLD AUTO: 0 K/UL
BASOPHILS NFR BLD AUTO: 0 %
EOSINOPHIL # BLD AUTO: 1.36 K/UL
EOSINOPHIL # BLD MANUAL: 149 /UL
EOSINOPHIL NFR BLD AUTO: 16 %
HCT VFR BLD CALC: 42.3 %
HGB BLD-MCNC: 13.5 G/DL
LYMPHOCYTES # BLD AUTO: 1.36 K/UL
LYMPHOCYTES NFR BLD AUTO: 16 %
MCHC RBC-ENTMCNC: 31.8 PG
MCHC RBC-ENTMCNC: 31.9 GM/DL
MCV RBC AUTO: 99.8 FL
MONOCYTES # BLD AUTO: 0.51 K/UL
MONOCYTES NFR BLD AUTO: 6 %
MSMEAR: NORMAL
NEUTROPHILS # BLD AUTO: 5.25 K/UL
NEUTROPHILS NFR BLD AUTO: 62 %
PLAT MORPH BLD: NORMAL
PLATELET # BLD AUTO: 216 K/UL
RBC # BLD: 4.24 M/UL
RBC # FLD: 13 %
RBC BLD AUTO: NORMAL
WBC # FLD AUTO: 8.47 K/UL

## 2024-07-03 RX ORDER — BUDESONIDE, GLYCOPYRROLATE, AND FORMOTEROL FUMARATE 160; 9; 4.8 UG/1; UG/1; UG/1
160-9-4.8 AEROSOL, METERED RESPIRATORY (INHALATION) TWICE DAILY
Qty: 1 | Refills: 3 | Status: ACTIVE | COMMUNITY
Start: 2024-07-03 | End: 1900-01-01

## 2024-07-04 LAB
A ALTERNATA IGE QN: <0.1 KUA/L
A FUMIGATUS IGE QN: 0.24 KUA/L
C ALBICANS IGE QN: <0.1 KUA/L
C HERBARUM IGE QN: <0.1 KUA/L
CAT DANDER IGE QN: 0.75 KUA/L
COMMON RAGWEED IGE QN: 1.67 KUA/L
D FARINAE IGE QN: 1.1 KUA/L
D PTERONYSS IGE QN: 0.76 KUA/L
DEPRECATED A ALTERNATA IGE RAST QL: 0 (ref 0–?)
DEPRECATED A FUMIGATUS IGE RAST QL: NORMAL (ref 0–?)
DEPRECATED C ALBICANS IGE RAST QL: 0
DEPRECATED C HERBARUM IGE RAST QL: 0 (ref 0–?)
DEPRECATED CAT DANDER IGE RAST QL: 2 (ref 0–?)
DEPRECATED COMMON RAGWEED IGE RAST QL: 2 (ref 0–?)
DEPRECATED D FARINAE IGE RAST QL: 2 (ref 0–?)
DEPRECATED D PTERONYSS IGE RAST QL: 2 (ref 0–?)
DEPRECATED DOG DANDER IGE RAST QL: 2 (ref 0–?)
DEPRECATED M RACEMOSUS IGE RAST QL: 0
DEPRECATED ROACH IGE RAST QL: NORMAL (ref 0–?)
DEPRECATED TIMOTHY IGE RAST QL: 2 (ref 0–?)
DEPRECATED WHITE OAK IGE RAST QL: 4 (ref 0–?)
DOG DANDER IGE QN: 1.48 KUA/L
M RACEMOSUS IGE QN: <0.1 KUA/L
ROACH IGE QN: 0.13 KUA/L
TIMOTHY IGE QN: 2.41 KUA/L
TOTAL IGE SMQN RAST: 136 KU/L
WHITE OAK IGE QN: 17.7 KUA/L

## 2024-07-08 LAB
ALTERN TENCAPG(M6): 3.4 MCG/ML
ASPER FUMCAPG(M3): 16.4 MCG/ML
AUREOBASCAPG(M12): 2.1 MCG/ML
LACEYELLA SACCHARI IGG: 4.1 MCG/ML
MICROPOLYCAPG(M22): <2 MCG/ML
PENIC CHRYCAPG(M1): 13 MCG/ML
PHOMA BETAE IGG: 4.5 MCG/ML
TRICHODERMA VIRIDE IGG: 2.7 MCG/ML

## 2024-07-24 RX ORDER — FLUTICASONE PROPIONATE AND SALMETEROL XINAFOATE 115; 21 UG/1; UG/1
115-21 AEROSOL, METERED RESPIRATORY (INHALATION)
Qty: 1 | Refills: 3 | Status: DISCONTINUED | COMMUNITY
Start: 2024-07-15 | End: 2024-07-24

## 2024-07-24 RX ORDER — BUDESONIDE, GLYCOPYRROLATE, AND FORMOTEROL FUMARATE 160; 9; 4.8 UG/1; UG/1; UG/1
160-9-4.8 AEROSOL, METERED RESPIRATORY (INHALATION) TWICE DAILY
Qty: 3 | Refills: 2 | Status: ACTIVE | COMMUNITY
Start: 2024-07-17 | End: 1900-01-01

## 2024-09-09 ENCOUNTER — APPOINTMENT (OUTPATIENT)
Dept: PULMONOLOGY | Facility: CLINIC | Age: 64
End: 2024-09-09

## 2024-12-09 ENCOUNTER — APPOINTMENT (OUTPATIENT)
Dept: PULMONOLOGY | Facility: CLINIC | Age: 64
End: 2024-12-09

## 2025-05-14 ENCOUNTER — RX RENEWAL (OUTPATIENT)
Age: 65
End: 2025-05-14